# Patient Record
(demographics unavailable — no encounter records)

---

## 2024-11-06 NOTE — PHYSICAL EXAM
[Alert] : alert [Well Nourished] : well nourished [Healthy Appearing] : healthy appearing [Well Developed] : well developed [Normal Voice/Communication] : normal voice/communication [Normal] : the appearance was normal, neck was supple [No Respiratory Distress] : no respiratory distress [No Acc Muscle Use] : no accessory muscle use [Respiration, Rhythm And Depth] : normal respiratory rhythm and effort [Auscultation Breath Sounds / Voice Sounds] : lungs were clear to auscultation bilaterally [Normal S1, S2] : normal S1 and S2 [Heart Rate And Rhythm] : heart rate was normal and rhythm regular [Edema] : edema was not present [No Masses] : no abdominal mass palpated [Abdomen Tenderness] : non-tender [Abdomen Soft] : soft [No CVA Tenderness] : no CVA  tenderness [No Spinal Tenderness] : no spinal tenderness [No Focal Deficits] : no focal deficits [Normal Affect] : the affect was normal [Normal Mood] : the mood was normal [de-identified] : BO [de-identified] : 3/6 systolic murmur [de-identified] : walking with small assist [de-identified] : dry skin, legs, better - thin frail skin-  no bruises [de-identified] : denies depression

## 2024-11-06 NOTE — ADDENDUM
[FreeTextEntry1] : pt had one fall - no injurry she is more unsteady on her feet  and would like pt for gait and blance

## 2024-11-06 NOTE — HISTORY OF PRESENT ILLNESS
[FreeTextEntry1] : pt is 99 today here with aide and daughter PT ended - will restart in 2024 BP in the am 130s 140s range yesterday 144/73, mid day 120/70 - end of day 114 /62 one day as low as 88/57 in pm no chest pain no shortness of breath  12/20/23 pt is well had labs - all wnl now were depends all the time  2/14/24 pt is well fell this weekend - small bruise missed the toilet seat 122/75 by her machine depends in the day for protection not incontinent  5/16/24 pt is well almost 100 no recent falls good  spirits  no pain BP never high occas low BP 90s little dizzy in the am - sits on edge of bed - and then ok mild SOB  Dec 7 , 23 - last labs - ok  6/19/24 pts BP went high to 160s systolic on 2.5 amlodipine went back to 5 mg on Vonnie 3 has been better since then  8/7/24 pt has been well has a new aide eating better  11/6/24 needs flu vaccine going to audiologist will need covid blooster  all labs wnl creat 1.71  Bp lower today but generally running in the normal range

## 2024-11-06 NOTE — ASSESSMENT
[FreeTextEntry1] : pt is doing well with  current meds will watch BP make sure not dipping often no cardiac symptoms will check blood and return in Dec and reorder PT next visit [facility] flu vaccine today  12/20/23 pt is doing well 2 lb weight loss did have recent URI will return in 2 months all labs are normal seen with daughter  2/14/24 seen with  daughter - would be 71st anniversary - is very alert had one fall - bruised  left knee bp is good weight is up 4 lbs has advance directives  5/16/24 BP is low decrease amlodipine to  2.5 mg cont others has gained two lbs no  chf   8/7/24 watching BP quite variable today low will watch and consider lowering amlodpine again but last time - was too high after log is variable aide reports sob on exertion observed the patient briskly walking to bedroom oxygen remained 97%, HR increased to 83 BP increased to 113 systolic  mild sob family not wanting to re-echo pt is  comfortable presently  will check home labs  11/6/24 mild sob on exertion getting more confused bp is controlled to check ears again flu vaccine needs covid labs were stable

## 2025-01-09 NOTE — ASSESSMENT
[FreeTextEntry1] : pt is doing well with  current meds will watch BP make sure not dipping often no cardiac symptoms will check blood and return in Dec and reorder PT next visit [facility] flu vaccine today  12/20/23 pt is doing well 2 lb weight loss did have recent URI will return in 2 months all labs are normal seen with daughter  2/14/24 seen with  daughter - would be 71st anniversary - is very alert had one fall - bruised  left knee bp is good weight is up 4 lbs has advance directives  5/16/24 BP is low decrease amlodipine to  2.5 mg cont others has gained two lbs no  chf   8/7/24 watching BP quite variable today low will watch and consider lowering amlodpine again but last time - was too high after log is variable aide reports sob on exertion observed the patient briskly walking to bedroom oxygen remained 97%, HR increased to 83 BP increased to 113 systolic  mild sob family not wanting to re-echo pt is  comfortable presently  will check home labs  11/6/24 mild sob on exertion getting more confused bp is controlled to check ears again flu vaccine needs covid labs were stable  1/9/25 pt has lost some weight suggesting adding pudding additional meal BP low today but has been fine getting home PT AS - sob on exertion [place] went to audiology - no more can be done skin lesions cheeks - derm f/up

## 2025-01-09 NOTE — REVIEW OF SYSTEMS
[Loss Of Hearing] : hearing loss [SOB on Exertion] : shortness of breath during exertion [Skin Lesions] : skin lesion [Confused] : confusion [Difficulty Walking] : difficulty walking [Negative] : Heme/Lymph [Chest Pain] : no chest pain

## 2025-01-09 NOTE — HISTORY OF PRESENT ILLNESS
[FreeTextEntry1] : pt is 99 today here with aide and daughter PT ended - will restart in 2024 BP in the am 130s 140s range yesterday 144/73, mid day 120/70 - end of day 114 /62 one day as low as 88/57 in pm no chest pain no shortness of breath  12/20/23 pt is well had labs - all wnl now were depends all the time  2/14/24 pt is well fell this weekend - small bruise missed the toilet seat 122/75 by her machine depends in the day for protection not incontinent  5/16/24 pt is well almost 100 no recent falls good  spirits  no pain BP never high occas low BP 90s little dizzy in the am - sits on edge of bed - and then ok mild SOB  Dec 7 , 23 - last labs - ok  6/19/24 pts BP went high to 160s systolic on 2.5 amlodipine went back to 5 mg on Vonnie 3 has been better since then  8/7/24 pt has been well has a new aide eating better  11/6/24 needs flu vaccine going to audiologist will need covid blooster  all labs wnl creat 1.71  Bp lower today but generally running in the normal range  1/9/25 pt has been well has [facility] PT told BP has been fine more stamina has scaly lesion on rt cheek - and left cheek

## 2025-01-09 NOTE — PHYSICAL EXAM
[Alert] : alert [Well Nourished] : well nourished [Healthy Appearing] : healthy appearing [Well Developed] : well developed [Normal Voice/Communication] : normal voice/communication [Normal] : the appearance was normal, neck was supple [No Respiratory Distress] : no respiratory distress [No Acc Muscle Use] : no accessory muscle use [Respiration, Rhythm And Depth] : normal respiratory rhythm and effort [Auscultation Breath Sounds / Voice Sounds] : lungs were clear to auscultation bilaterally [Normal S1, S2] : normal S1 and S2 [Heart Rate And Rhythm] : heart rate was normal and rhythm regular [Edema] : edema was not present [Abdomen Tenderness] : non-tender [No Masses] : no abdominal mass palpated [Abdomen Soft] : soft [No CVA Tenderness] : no CVA  tenderness [No Spinal Tenderness] : no spinal tenderness [No Focal Deficits] : no focal deficits [Normal Affect] : the affect was normal [Normal Mood] : the mood was normal [de-identified] : BO [de-identified] : sob on exertion [de-identified] : 3/6 systolic murmur [de-identified] : walking with small assist [de-identified] : dry skin, legs, better - thin frail skin-   bruise rt knee - keratosis on right cheek - with scaly skin and scaly lesion   [de-identified] : denies depression

## 2025-03-14 NOTE — PHYSICAL EXAM
[Alert] : alert [Well Nourished] : well nourished [Healthy Appearing] : healthy appearing [Well Developed] : well developed [Normal Voice/Communication] : normal voice/communication [Normal] : the appearance was normal, neck was supple [No Respiratory Distress] : no respiratory distress [No Acc Muscle Use] : no accessory muscle use [Respiration, Rhythm And Depth] : normal respiratory rhythm and effort [Auscultation Breath Sounds / Voice Sounds] : lungs were clear to auscultation bilaterally [Normal S1, S2] : normal S1 and S2 [Heart Rate And Rhythm] : heart rate was normal and rhythm regular [Edema] : edema was not present [Abdomen Tenderness] : non-tender [No Masses] : no abdominal mass palpated [Abdomen Soft] : soft [No CVA Tenderness] : no CVA  tenderness [No Spinal Tenderness] : no spinal tenderness [No Focal Deficits] : no focal deficits [Normal Affect] : the affect was normal [Normal Mood] : the mood was normal [de-identified] : [place] - small ceruemn removed rt ear - lavaged - needs more removal [de-identified] : sob on exertion [de-identified] : 3/6 systolic murmur [de-identified] : walking with small assist [de-identified] : dry skin, legs, better - thin frail skin-  - keratosis on right cheek - with scaly skin and scaly lesion   [de-identified] : BO

## 2025-03-14 NOTE — REVIEW OF SYSTEMS
[Eyesight Problems] : eyesight problems [Loss Of Hearing] : hearing loss [Incontinence] : incontinence [Confused] : confusion [Limb Weakness] : limb weakness [Difficulty Walking] : difficulty walking [Easy Bruising] : a tendency for easy bruising [Negative] : Endocrine

## 2025-03-14 NOTE — HISTORY OF PRESENT ILLNESS
[FreeTextEntry1] : pt is 99 today here with aide and daughter PT ended - will restart in 2024 BP in the am 130s 140s range yesterday 144/73, mid day 120/70 - end of day 114 /62 one day as low as 88/57 in pm no chest pain no shortness of breath  12/20/23 pt is well had labs - all wnl now were depends all the time  2/14/24 pt is well fell this weekend - small bruise missed the toilet seat 122/75 by her machine depends in the day for protection not incontinent  5/16/24 pt is well almost 100 no recent falls good  spirits  no pain BP never high occas low BP 90s little dizzy in the am - sits on edge of bed - and then ok mild SOB  Dec 7 , 23 - last labs - ok  6/19/24 pts BP went high to 160s systolic on 2.5 amlodipine went back to 5 mg on Vonnie 3 has been better since then  8/7/24 pt has been well has a new aide eating better  11/6/24 needs flu vaccine going to audiologist will need covid blo[place]  all labs wnl creat 1.71  Bp lower today but generally running in the normal range  1/9/25 pt has been well has [facility] PT told BP has been fine more stamina has scaly lesion on rt cheek - and left cheek  3/14/25 pt getting new aide here with  two daughters Adrienne  was supposed to gt hair cut - felt too tired to go /63, HR 81 at the time able to do home PT denies SOB, CP, denies fatigue or loss of strength  eats good breakfast less appetite for other meals noshing heairng aides not working pt chewed on them

## 2025-03-14 NOTE — ASSESSMENT
[FreeTextEntry1] : pt is doing well with  current meds will watch BP make sure not dipping often no cardiac symptoms will check blood and return in Dec and reorder PT next visit [facility] flu vaccine today  12/20/23 pt is doing well 2 lb weight loss did have recent URI will return in 2 months all labs are normal seen with daughter  2/14/24 seen with  daughter - would be 71st anniversary - is very alert had one fall - bruised  left knee bp is good weight is up 4 lbs has advance directives  5/16/24 BP is low decrease amlodipine to  2.5 mg cont others has gained two lbs no  chf   8/7/24 watching BP quite variable today low will watch and consider lowering amlodpine again but last time - was too high after log is variable aide reports sob on exertion observed the patient briskly walking to bedroom oxygen remained 97%, HR increased to 83 BP increased to 113 systolic  mild sob family not wanting to re-echo pt is  comfortable presently  will check home labs  11/6/24 mild sob on exertion getting more confused bp is controlled to check ears again flu vaccine needs covid labs were stable  1/9/25 pt has lost some weight suggesting adding pudding additional meal BP low today but has been fine getting home PT AS - sob on exertion [place] went to audiology - no more can be done skin lesions cheeks - derm f/up  3/14/25 pt gained weight back - 2lbs BP better cannot get all cerumen out - debrox AS and CAD

## 2025-06-18 NOTE — HISTORY OF PRESENT ILLNESS
[FreeTextEntry1] : pt is 99 today here with aide and daughter PT ended - will restart in 2024 BP in the am 130s 140s range yesterday 144/73, mid day 120/70 - end of day 114 /62 one day as low as 88/57 in pm no chest pain no shortness of breath  12/20/23 pt is well had labs - all wnl now were depends all the time  2/14/24 pt is well fell this weekend - small bruise missed the toilet seat 122/75 by her machine depends in the day for protection not incontinent  5/16/24 pt is well almost 100 no recent falls good  spirits  no pain BP never high occas low BP 90s little dizzy in the am - sits on edge of bed - and then ok mild SOB  Dec 7 , 23 - last labs - ok  6/19/24 pts BP went high to 160s systolic on 2.5 amlodipine went back to 5 mg on Vonnie 3 has been better since then  8/7/24 pt has been well has a new aide eating better  11/6/24 needs flu vaccine going to audiologist will need covid blo[place]  all labs wnl creat 1.71  Bp lower today but generally running in the normal range  1/9/25 pt has been well has [facility] PT told BP has been fine more stamina has scaly lesion on rt cheek - and left cheek  3/14/25 pt getting new aide here with  two daughters Sheryl and Nila  was supposed to gt hair cut - felt too tired to go /63, HR 81 at the time able to do home PT denies SOB, CP, denies fatigue or loss of strength  eats good breakfast less appetite for other meals noshing heairng aides not working pt chewed on them  6/18/25 here with Sheryl and new aide BP is very erratic 90s tp 170s systolic aide states she is not sob, no cp eats well reluctant to takes meds

## 2025-06-18 NOTE — ASSESSMENT
[FreeTextEntry1] : pt is doing well with  current meds will watch BP make sure not dipping often no cardiac symptoms will check blood and return in Dec and reorder PT next visit [facility] flu vaccine today  12/20/23 pt is doing well 2 lb weight loss did have recent URI will return in 2 months all labs are normal seen with daughter  2/14/24 seen with  daughter - would be 71st anniversary - is very alert had one fall - bruised  left knee bp is good weight is up 4 lbs has advance directives  5/16/24 BP is low decrease amlodipine to  2.5 mg cont others has gained two lbs no  chf   8/7/24 watching BP quite variable today low will watch and consider lowering amlodpine again but last time - was too high after log is variable aide reports sob on exertion observed the patient briskly walking to bedroom oxygen remained 97%, HR increased to 83 BP increased to 113 systolic  mild sob family not wanting to re-echo pt is  comfortable presently  will check home labs  11/6/24 mild sob on exertion getting more confused bp is controlled to check ears again flu vaccine needs covid labs were stable  1/9/25 pt has lost some weight suggesting adding pudding additional meal BP low today but has been fine getting home PT AS - sob on exertion [place] went to audiology - no more can be done skin lesions cheeks - derm f/up  3/14/25 pt gained weight back - 2lbs BP better cannot get all cerumen out - debrox AS and CAD  6/18/25 ordered home labs cont current meds  protein powder  lost some weight  sleeping a lot  no chf good spirits

## 2025-06-18 NOTE — PHYSICAL EXAM
[Alert] : alert [Well Nourished] : well nourished [Healthy Appearing] : healthy appearing [Well Developed] : well developed [Normal Voice/Communication] : normal voice/communication [Normal] : the appearance was normal, neck was supple [No Respiratory Distress] : no respiratory distress [No Acc Muscle Use] : no accessory muscle use [Respiration, Rhythm And Depth] : normal respiratory rhythm and effort [Auscultation Breath Sounds / Voice Sounds] : lungs were clear to auscultation bilaterally [Normal S1, S2] : normal S1 and S2 [Heart Rate And Rhythm] : heart rate was normal and rhythm regular [Edema] : edema was not present [Abdomen Tenderness] : non-tender [No Masses] : no abdominal mass palpated [Abdomen Soft] : soft [No CVA Tenderness] : no CVA  tenderness [No Spinal Tenderness] : no spinal tenderness [No Focal Deficits] : no focal deficits [Normal Affect] : the affect was normal [Normal Mood] : the mood was normal [de-identified] : sob on exertion [de-identified] : 3/6 systolic murmur [de-identified] : walking with small assist [de-identified] : dry skin, legs, better - thin frail skin-  - keratosis on right cheek - with scaly skin and scaly lesion  , bruises [de-identified] : BO

## 2025-06-18 NOTE — REVIEW OF SYSTEMS
[Eyesight Problems] : eyesight problems [Loss Of Hearing] : hearing loss [Incontinence] : incontinence [Confused] : confusion [Easy Bruising] : a tendency for easy bruising [Negative] : Endocrine [FreeTextEntry4] : did not go to ent to clean ears